# Patient Record
Sex: FEMALE | Race: WHITE | NOT HISPANIC OR LATINO | Employment: UNEMPLOYED | URBAN - METROPOLITAN AREA
[De-identification: names, ages, dates, MRNs, and addresses within clinical notes are randomized per-mention and may not be internally consistent; named-entity substitution may affect disease eponyms.]

---

## 2022-08-11 DIAGNOSIS — G62.9 NEUROPATHY: Primary | ICD-10-CM

## 2022-09-06 ENCOUNTER — OFFICE VISIT (OUTPATIENT)
Dept: ORTHOPEDICS | Facility: CLINIC | Age: 42
End: 2022-09-06
Payer: MEDICAID

## 2022-09-06 VITALS
TEMPERATURE: 98 F | OXYGEN SATURATION: 98 % | WEIGHT: 164 LBS | HEART RATE: 83 BPM | SYSTOLIC BLOOD PRESSURE: 114 MMHG | DIASTOLIC BLOOD PRESSURE: 78 MMHG | HEIGHT: 66 IN | RESPIRATION RATE: 16 BRPM | BODY MASS INDEX: 26.36 KG/M2

## 2022-09-06 DIAGNOSIS — R20.0 BILATERAL HAND NUMBNESS: Primary | ICD-10-CM

## 2022-09-06 PROCEDURE — 3074F PR MOST RECENT SYSTOLIC BLOOD PRESSURE < 130 MM HG: ICD-10-PCS | Mod: CPTII,,, | Performed by: NURSE PRACTITIONER

## 2022-09-06 PROCEDURE — 1160F RVW MEDS BY RX/DR IN RCRD: CPT | Mod: CPTII,,, | Performed by: NURSE PRACTITIONER

## 2022-09-06 PROCEDURE — 1159F PR MEDICATION LIST DOCUMENTED IN MEDICAL RECORD: ICD-10-PCS | Mod: CPTII,,, | Performed by: NURSE PRACTITIONER

## 2022-09-06 PROCEDURE — 99204 PR OFFICE/OUTPT VISIT, NEW, LEVL IV, 45-59 MIN: ICD-10-PCS | Mod: S$PBB,,, | Performed by: NURSE PRACTITIONER

## 2022-09-06 PROCEDURE — 99214 OFFICE O/P EST MOD 30 MIN: CPT | Mod: PBBFAC | Performed by: NURSE PRACTITIONER

## 2022-09-06 PROCEDURE — 3074F SYST BP LT 130 MM HG: CPT | Mod: CPTII,,, | Performed by: NURSE PRACTITIONER

## 2022-09-06 PROCEDURE — 99204 OFFICE O/P NEW MOD 45 MIN: CPT | Mod: S$PBB,,, | Performed by: NURSE PRACTITIONER

## 2022-09-06 PROCEDURE — 3008F BODY MASS INDEX DOCD: CPT | Mod: CPTII,,, | Performed by: NURSE PRACTITIONER

## 2022-09-06 PROCEDURE — 3078F PR MOST RECENT DIASTOLIC BLOOD PRESSURE < 80 MM HG: ICD-10-PCS | Mod: CPTII,,, | Performed by: NURSE PRACTITIONER

## 2022-09-06 PROCEDURE — 1160F PR REVIEW ALL MEDS BY PRESCRIBER/CLIN PHARMACIST DOCUMENTED: ICD-10-PCS | Mod: CPTII,,, | Performed by: NURSE PRACTITIONER

## 2022-09-06 PROCEDURE — 3078F DIAST BP <80 MM HG: CPT | Mod: CPTII,,, | Performed by: NURSE PRACTITIONER

## 2022-09-06 PROCEDURE — 3008F PR BODY MASS INDEX (BMI) DOCUMENTED: ICD-10-PCS | Mod: CPTII,,, | Performed by: NURSE PRACTITIONER

## 2022-09-06 PROCEDURE — 1159F MED LIST DOCD IN RCRD: CPT | Mod: CPTII,,, | Performed by: NURSE PRACTITIONER

## 2022-09-06 NOTE — PROGRESS NOTES
"  Subjective:       New pt   Patient ID: April Molina is a 42 y.o. female. Non-smoker. Employment HX: teacher, currently self employed.    Seen [facility] ortho for same DX since n/a.   Chief Complaint: Numbness of the Left Upper Arm    HPI:    Bilateral L > R numb  elbow and hand  hand and wrist pain. Right dominate  Injury: no known injury  Onset: several years ago worsening over time  Modifying Factors: worse with activity, improved with rest, and increased pain at PM  Associated Symptoms: numbness, decreased ROM, and difficulty sleeping s/t pain  Activity: sedentary with light activity and normal activity without exercise or sports activity.   Previous Treatments: nocturnal wrist splint, HEP , and RX OT completed 1 wks/ 1 xs per week d/c'd by OT after one visit  without significant relief.  PMH: negative for contraindications for NSAID use  Family History: + OA    Note: Referred for left arm / hand numbness, no EMG study     No current outpatient medications on file.  Review of patient's allergies indicates:  No Known Allergies  No results found for: HGBA1C   Body mass index is 26.47 kg/m².   Vitals:    09/06/22 1039   BP: 114/78   Pulse: 83   Resp: 16   Temp: 98 °F (36.7 °C)   SpO2: 98%   Weight: 74.4 kg (164 lb)   Height: 5' 6" (1.676 m)      Review of Systems:  A ten-point review of systems was performed and is negative, except as mentioned above.   Objective:   MSK Bilateral Hand/ Wrist  General:  no apparent distress, no pain indicators, obesity  Inspection: no masses/cyst/nodules, no swelling, no erythema, no contusion, no deconditioning, no deformity, no contracture, no scars  Palpation:  non-tender, normal temperature, palm without nodules or thickening, radial pulse equal 2+  ROM all of the following bilateral without limitation, non-painful  Passive Flexion / Extension    Radial Deviation   Ulnar Deviation  Strength unless otherwise stated, all of the following bilateral 5 / 5, non-painful   Resisted Flexion "       Resisted Extension     Resisted Radial Deviation   Resisted Ulnar Deviation      4 / 5 NP (L)   4 / 5 NP (R)      Special Testing:  Trigger Finger   -- (R)  -- (L)  Phalen    + (R)  + (L)  Jarret Carpal Compression + (R)  + (L)  Tinel Test    -- (R)  -- (L)  Finkelstein Test   -- (R)  -- (L)    MSK Bilateral Elbow                                                                                                   Inspection: no masses/cyst/nodules, no swelling, no erythema, no contusion, no deconditioning, no deformity, no contracture, no scars  Palpation:  non-tender, normal temperature, tendons in palm without nodules or thickening, radial pulse equal 2+  ROM:    Passive Flexion / Extension   movement full and smooth non-painful (R)  movement full and smooth non-painful (L)  Supination    movement full and smooth non-painful (R)  movement full and smooth non-painful (L)  Pronation    movement full and smooth non-painful (R)  movement full and smooth non-painful (L)  Strength:   Resisted Flexion     5 / 5 (R)   5 / 5 (L)  Resisted Extension   5 / 5 (R)   5 / 5 (L)   Strength   5 / 5 (R) 5 / 5 (L)  Special Testing:  Bicep Tendon  Hook Test   -- (R)  -- (L)  Juan Sign  -- (R)  -- (L)  Tennis Elbow  LE Test   -- (R)  -- (L)  Golfer's Elbow  ME Test   -- (R)  -- (L)  Radial Tunnel Syndrome  Tinels Sign  + (R)  -- (L)  Hand Exam normal  Shoulder Exam normal  Neurovascular: Intact to light touch  Neuro/ Psych: Awake, alert, oriented, normal mood and affect  Lymphatic: No LAD  Skin/ Soft Tissue: no rash, skin intact  Assessment:       Imaging: n/a    EMR Review: completed with noted Referral documentation reviewed    1. Bilateral hand numbness    2. BMI 26.0-26.9,adult      Plan:       Ongoing education about DX and treatment recommendations including conservative treatments of daily HEP for carpal tunnel, nocturnal splinting of wrist PRN and OTC NSAID as needed according to label instructions if able to  tolerate.   Treatment plan:  bilateral hand, arm numbness L>R  EMG study needed for definitive DX. Patient referred to Dr. Stephen Kenny [phone] Address: [address] [place] [id number]. Patient instructed to call clinic for f/u once study completed. Appropriate treatment plan will be based on EMG findings.   Procedure: n/a  RX Medications: continue medications as RX per PCP   RTC: after EMG study complete     Roxie LOZANO    Timed Billing Note  Total Time Spent with Patient: 45 minutes  Visit Start Time: 1115  15 minutes spent prior to visit reviewing EMR, prior labs and x-rays.  20 minutes spent in visit with patient completing exam, obtaining history, educating on DX and treatment plan.  10 minutes spent after visit completing EMR documentation.   Visit End Time: 1200

## 2022-10-31 ENCOUNTER — OFFICE VISIT (OUTPATIENT)
Dept: ORTHOPEDICS | Facility: CLINIC | Age: 42
End: 2022-10-31
Payer: MEDICAID

## 2022-10-31 DIAGNOSIS — G56.23 CUBITAL TUNNEL SYNDROME, BILATERAL: ICD-10-CM

## 2022-10-31 DIAGNOSIS — G56.03 BILATERAL CARPAL TUNNEL SYNDROME: Primary | ICD-10-CM

## 2022-10-31 PROCEDURE — 1159F PR MEDICATION LIST DOCUMENTED IN MEDICAL RECORD: ICD-10-PCS | Mod: CPTII,95,, | Performed by: NURSE PRACTITIONER

## 2022-10-31 PROCEDURE — 1159F MED LIST DOCD IN RCRD: CPT | Mod: CPTII,95,, | Performed by: NURSE PRACTITIONER

## 2022-10-31 PROCEDURE — 1160F RVW MEDS BY RX/DR IN RCRD: CPT | Mod: CPTII,95,, | Performed by: NURSE PRACTITIONER

## 2022-10-31 PROCEDURE — 99213 PR OFFICE/OUTPT VISIT, EST, LEVL III, 20-29 MIN: ICD-10-PCS | Mod: 95,,, | Performed by: NURSE PRACTITIONER

## 2022-10-31 PROCEDURE — 99213 OFFICE O/P EST LOW 20 MIN: CPT | Mod: 95,,, | Performed by: NURSE PRACTITIONER

## 2022-10-31 PROCEDURE — 1160F PR REVIEW ALL MEDS BY PRESCRIBER/CLIN PHARMACIST DOCUMENTED: ICD-10-PCS | Mod: CPTII,95,, | Performed by: NURSE PRACTITIONER

## 2022-10-31 NOTE — PROGRESS NOTES
Telemedicine Consent  The patient location is: Home  The chief complaint leading to consultation is: bilateral hand numbness  Visit type: Audio only.  Attempt made for video TM visit but was unsuccessful due to technical issues.  30 minutes of total time spent on the encounter, which includes face-to-face time and non-face-to-face time preparing to see the patient (eg. review of tests), obtaining and/or reviewing separately obtained history, documenting clinical information in the electronic or other health record, independently interpreting results (not separately reported) and communicating results to the patient/family/caregiver or care coordination (not separately reported).   I have reviewed patient's name,  and picture ID if applicable.  I discussed with patient regarding medical services by telemedicine (1) informed of the relationship between the physician and patient and the respective role of any other health care provider with respect to management of the patient (2) session are not recorded and personal health information is protected; and (3) notified that he or she may decline to receive medical services by telemedicine and may withdraw from such care at any time.  Patient consented to consult by telemedicine.     Subjective:       Follow up   Patient ID: April Molina is a 42 y.o. female. Non-smoker. Employment HX: teacher, currently self employed.    Seen [facility] ortho for same DX since n/a.   Chief Complaint: Pain of the Left Hand    HPI:    Bilateral L > R numb  elbow and hand  hand and wrist pain. Right dominate  Injury: no known injury  Onset: several years ago worsening over time  Modifying Factors: worse with activity, improved with rest, and increased pain at PM  Associated Symptoms: numbness, decreased ROM, and difficulty sleeping s/t pain  Activity: sedentary with light activity and normal activity without exercise or sports activity.   Previous Treatments: nocturnal wrist splint, HEP , and  RX OT completed 1 wks/ 1 xs per week d/c'd by OT after one visit  without significant relief.  PMH: negative for contraindications for NSAID use  Family History: + OA    Note: EMG study completed 9/22/22. No changes in symptoms with HEP and splinting.     No current outpatient medications on file.  Review of patient's allergies indicates:  No Known Allergies  No results found for: HGBA1C   There is no height or weight on file to calculate BMI.   There were no vitals filed for this visit.     Review of Systems:  A ten-point review of systems was performed and is negative, except as mentioned above.   Objective:   N/a telemedicine visit   Assessment:       Imaging: n/a    EMG study dated 9/22/22 w/ Dr. Mitchell:  There is abnormal study.  There is electrodiagnostic evidence of bilateral median neuropathy at the wrist, carpal tunnel syndrome, moderate on the left and mild on the right.  Bilateral ulnar neuropathy at the elbow mild on the left and moderate on the right.    EMR Review: completed with noted Referral documentation reviewed and prior visit 9/6/22 reviewed.    1. Bilateral carpal tunnel syndrome    2. Cubital tunnel syndrome, bilateral    3. BMI 26.0-26.9,adult      Plan:       Ongoing education about DX and treatment recommendations including conservative treatments of daily HEP for carpal tunnel, nocturnal splinting of wrist PRN and OTC NSAID as needed according to label instructions if able to tolerate.   Treatment plan:  bilateral hand, arm numbness L>R  EMG study received and recommend CSI today for symptom relief.  If inadequate relief at f/u will discuss referral to ortho res.    Procedure: n/a  RX Medications: continue medications as RX per PCP   RTC: next available procedure only visit  and 3 months f/u from procedure date telemedicine.      Roxie LOZANO    Timed Billing Note  Total Time Spent with Patient: 30 minutes  Visit Start Time: 0830  5 minutes spent prior to visit reviewing EMR,  prior labs and x-rays.  15 minutes spent in audio only visit with patient for medical discussion, obtaining history, educating on DX and treatment plan.  10 minutes spent after visit completing EMR documentation.   Visit End Time: 0900

## 2022-11-11 ENCOUNTER — PROCEDURE VISIT (OUTPATIENT)
Dept: ORTHOPEDICS | Facility: CLINIC | Age: 42
End: 2022-11-11
Payer: MEDICAID

## 2022-11-11 DIAGNOSIS — G56.03 BILATERAL CARPAL TUNNEL SYNDROME: Primary | ICD-10-CM

## 2022-11-11 PROCEDURE — 99499 NO LOS: ICD-10-PCS | Mod: S$PBB,,, | Performed by: NURSE PRACTITIONER

## 2022-11-11 PROCEDURE — 99499 UNLISTED E&M SERVICE: CPT | Mod: S$PBB,,, | Performed by: NURSE PRACTITIONER

## 2022-11-11 PROCEDURE — 20526: ICD-10-PCS | Mod: 50,S$PBB,, | Performed by: NURSE PRACTITIONER

## 2022-11-11 PROCEDURE — 20526 THER INJECTION CARP TUNNEL: CPT | Mod: 50,PBBFAC | Performed by: NURSE PRACTITIONER

## 2022-11-11 RX ORDER — TRIAMCINOLONE ACETONIDE 40 MG/ML
40 INJECTION, SUSPENSION INTRA-ARTICULAR; INTRAMUSCULAR
Status: DISCONTINUED | OUTPATIENT
Start: 2022-11-11 | End: 2022-11-11 | Stop reason: HOSPADM

## 2022-11-11 RX ORDER — TRIAMCINOLONE ACETONIDE 40 MG/ML
40 INJECTION, SUSPENSION INTRA-ARTICULAR; INTRAMUSCULAR
Status: ACTIVE | OUTPATIENT
Start: 2022-11-11

## 2022-11-11 RX ADMIN — TRIAMCINOLONE ACETONIDE 40 MG: 40 INJECTION, SUSPENSION INTRA-ARTICULAR; INTRAMUSCULAR at 08:11

## 2022-11-11 NOTE — PROCEDURES
Carpal Tunnel Bilateral    Date/Time: 11/11/2022 8:10 AM  Performed by: Roxie Rosa NP  Authorized by: Roxie Rosa NP     Consent Done?:  Yes (Verbal)  Indications:  Pain  Site marked: the procedure site was marked    Timeout: prior to procedure the correct patient, procedure, and site was verified    Local anesthesia used?: Yes    Local anesthetic:  Topical anesthetic  Location:  Wrist  Site:  R carpal tunnel and L carpal tunnel  Ultrasonic Guidance for Needle Placement?: No    Needle size:  25 G  Approach:  Dorsal  Medications:  40 mg triamcinolone acetonide 40 mg/mL; 40 mg triamcinolone acetonide 40 mg/mL  Patient tolerance:  Patient tolerated the procedure well with no immediate complications     Additional Comments: Topical ethyl chloride was applied. Contents of syringe included:  40mg of Kenalog   Complications: None   EBL: None   Post Procedure: Patient alert, and moving all extremities. ROM improved, pain decreased.  Good peripheral pulses, no signs of vascular compromise and range of motion intact.  Aftercare instructions were given to patient at time of discharge.  Relative rest for 3 days-avoiding excess activity.  Place ice on the area for 15 minutes every 4-6 hours. Patient may take Tylenol a 1000 mg b.i.d. or ibuprofen 600 mg t.i.d. for the next 3-4 days if not on medication already and safe to take pending co-morbidities.  Protect the area for the next 1-8 hours if anesthetic was used.  Avoid excessive activity for the next 3-4 weeks.  ER precautions given for fever, severe joint pain or allergic reaction or other new symptoms related to the joint injection.

## 2023-02-06 ENCOUNTER — OFFICE VISIT (OUTPATIENT)
Dept: ORTHOPEDICS | Facility: CLINIC | Age: 43
End: 2023-02-06
Payer: MEDICAID

## 2023-02-06 VITALS — WEIGHT: 164 LBS | HEIGHT: 66 IN | BODY MASS INDEX: 26.36 KG/M2

## 2023-02-06 DIAGNOSIS — G56.03 BILATERAL CARPAL TUNNEL SYNDROME: Primary | ICD-10-CM

## 2023-02-06 DIAGNOSIS — G56.23 CUBITAL TUNNEL SYNDROME, BILATERAL: ICD-10-CM

## 2023-02-06 PROCEDURE — 1159F PR MEDICATION LIST DOCUMENTED IN MEDICAL RECORD: ICD-10-PCS | Mod: CPTII,95,, | Performed by: NURSE PRACTITIONER

## 2023-02-06 PROCEDURE — 99214 OFFICE O/P EST MOD 30 MIN: CPT | Mod: 95,,, | Performed by: NURSE PRACTITIONER

## 2023-02-06 PROCEDURE — 99214 PR OFFICE/OUTPT VISIT, EST, LEVL IV, 30-39 MIN: ICD-10-PCS | Mod: 95,,, | Performed by: NURSE PRACTITIONER

## 2023-02-06 PROCEDURE — 3008F BODY MASS INDEX DOCD: CPT | Mod: CPTII,95,, | Performed by: NURSE PRACTITIONER

## 2023-02-06 PROCEDURE — 1160F RVW MEDS BY RX/DR IN RCRD: CPT | Mod: CPTII,95,, | Performed by: NURSE PRACTITIONER

## 2023-02-06 PROCEDURE — 1160F PR REVIEW ALL MEDS BY PRESCRIBER/CLIN PHARMACIST DOCUMENTED: ICD-10-PCS | Mod: CPTII,95,, | Performed by: NURSE PRACTITIONER

## 2023-02-06 PROCEDURE — 3008F PR BODY MASS INDEX (BMI) DOCUMENTED: ICD-10-PCS | Mod: CPTII,95,, | Performed by: NURSE PRACTITIONER

## 2023-02-06 PROCEDURE — 1159F MED LIST DOCD IN RCRD: CPT | Mod: CPTII,95,, | Performed by: NURSE PRACTITIONER

## 2023-02-06 NOTE — PROGRESS NOTES
Telemedicine Consent  The patient location is: Home  The chief complaint leading to consultation is: bilateral hand numbness  Visit type: Audio only.  Attempt made for video TM visit but was unsuccessful due to technical issues.  30 minutes of total time spent on the encounter, which includes face-to-face time and non-face-to-face time preparing to see the patient (eg. review of tests), obtaining and/or reviewing separately obtained history, documenting clinical information in the electronic or other health record, independently interpreting results (not separately reported) and communicating results to the patient/family/caregiver or care coordination (not separately reported).   I have reviewed patient's name,  and picture ID if applicable.  I discussed with patient regarding medical services by telemedicine (1) informed of the relationship between the physician and patient and the respective role of any other health care provider with respect to management of the patient (2) session are not recorded and personal health information is protected; and (3) notified that he or she may decline to receive medical services by telemedicine and may withdraw from such care at any time.  Patient consented to consult by telemedicine.     Subjective:       Follow up   Patient ID: April Moilna is a 42 y.o. female. Non-smoker. Employment HX: teacher, currently self employed.    Seen [facility] ortho for same DX since n/a.   Chief Complaint: Pain of the Right Hand, Pain of the Left Hand, and Hand Pain (Carpal Tunnel)    HPI:    Bilateral L > R numb  elbow and hand  hand and wrist pain. Right dominate  Injury: no known injury  Onset: several years ago worsening over time  Modifying Factors: worse with activity, improved with rest, and increased pain at PM  Associated Symptoms: numbness, decreased ROM, and difficulty sleeping s/t pain  Activity: sedentary with light activity and normal activity without exercise or sports activity.  "  Previous Treatments: nocturnal wrist splint, HEP , CSI since 2022 last injection 11/11/22, and RX OT completed 1 wks/ 1 xs per week d/c'd by OT after one visit  with significant relief.  PMH: negative for contraindications for NSAID use  Family History: + OA    Note: Patient with complete relief to date.  Does not desire further treatments at this time.     No current outpatient medications on file.    Current Facility-Administered Medications:     triamcinolone acetonide injection 40 mg, 40 mg, Intra-articular, 1 time in Clinic/HOD, Roxie Rosa NP    triamcinolone acetonide injection 40 mg, 40 mg, Intra-articular, 1 time in Clinic/HOD, Roxie Rosa NP  Review of patient's allergies indicates:  No Known Allergies  No results found for: HGBA1C   Body mass index is 26.47 kg/m².   Vitals:    02/06/23 0849   Weight: 74.4 kg (164 lb)   Height: 5' 6" (1.676 m)     Review of Systems:  A ten-point review of systems was performed and is negative, except as mentioned above.   Objective:   N/a telemedicine visit   Assessment:       Imaging: n/a    EMG study dated 9/22/22 w/ Dr. Mitchell:  There is abnormal study.  There is electrodiagnostic evidence of bilateral median neuropathy at the wrist, carpal tunnel syndrome, moderate on the left and mild on the right.  Bilateral ulnar neuropathy at the elbow mild on the left and moderate on the right.    EMR Review: completed with noted prior visit 10/31/22 reviewed.    1. Bilateral carpal tunnel syndrome    2. Cubital tunnel syndrome, bilateral    3. BMI 26.0-26.9,adult      Plan:       Ongoing education about DX and treatment recommendations including conservative treatments of daily HEP for carpal tunnel, nocturnal splinting of wrist PRN and OTC NSAID as needed according to label instructions if able to tolerate.   Treatment plan: Carpal tunnel syndrome bilateral moderate and Ulnar tunnel syndrome at the elbow mild left and moderate right   Patient with complete " symptom relief at this time.  Encouraged to continue with HEP regularly and f/u with PCP  Procedure: n/a  RX Medications: continue medications as RX per PCP   RTC: PRN if symptoms worsen or return     Roxie LOZANO    Timed Billing Note  Total Time Spent with Patient: 30 minutes  Visit Start Time: 0830  5 minutes spent prior to visit reviewing EMR, prior labs and x-rays.  15 minutes spent in audio only visit with patient for medical discussion, obtaining history, educating on DX and treatment plan.  10 minutes spent after visit completing EMR documentation.   Visit End Time: 0900

## 2023-07-05 DIAGNOSIS — N81.9 FEMALE GENITAL PROLAPSE, UNSPECIFIED TYPE: Primary | ICD-10-CM

## 2023-08-22 PROBLEM — N81.9 PROLAPSE OF FEMALE PELVIC ORGANS: Status: ACTIVE | Noted: 2023-08-22

## 2023-08-22 PROBLEM — N39.3 SUI (STRESS URINARY INCONTINENCE, FEMALE): Status: ACTIVE | Noted: 2023-08-22

## 2023-08-30 ENCOUNTER — OFFICE VISIT (OUTPATIENT)
Dept: ORTHOPEDICS | Facility: CLINIC | Age: 43
End: 2023-08-30
Payer: MEDICAID

## 2023-08-30 ENCOUNTER — HOSPITAL ENCOUNTER (OUTPATIENT)
Dept: RADIOLOGY | Facility: HOSPITAL | Age: 43
Discharge: HOME | End: 2023-08-30
Attending: NURSE PRACTITIONER
Payer: MEDICAID

## 2023-08-30 VITALS — BODY MASS INDEX: 23.43 KG/M2 | WEIGHT: 145.81 LBS | HEIGHT: 66 IN

## 2023-08-30 DIAGNOSIS — R52 PAIN: ICD-10-CM

## 2023-08-30 DIAGNOSIS — G56.03 BILATERAL CARPAL TUNNEL SYNDROME: Primary | ICD-10-CM

## 2023-08-30 DIAGNOSIS — G56.23 CUBITAL TUNNEL SYNDROME, BILATERAL: ICD-10-CM

## 2023-08-30 PROCEDURE — 1160F PR REVIEW ALL MEDS BY PRESCRIBER/CLIN PHARMACIST DOCUMENTED: ICD-10-PCS | Mod: CPTII,,, | Performed by: NURSE PRACTITIONER

## 2023-08-30 PROCEDURE — 99213 OFFICE O/P EST LOW 20 MIN: CPT | Mod: PBBFAC | Performed by: NURSE PRACTITIONER

## 2023-08-30 PROCEDURE — 1160F RVW MEDS BY RX/DR IN RCRD: CPT | Mod: CPTII,,, | Performed by: NURSE PRACTITIONER

## 2023-08-30 PROCEDURE — 73130 X-RAY EXAM OF HAND: CPT | Mod: TC,LT

## 2023-08-30 PROCEDURE — 99215 OFFICE O/P EST HI 40 MIN: CPT | Mod: S$PBB,,, | Performed by: NURSE PRACTITIONER

## 2023-08-30 PROCEDURE — 99215 PR OFFICE/OUTPT VISIT, EST, LEVL V, 40-54 MIN: ICD-10-PCS | Mod: S$PBB,,, | Performed by: NURSE PRACTITIONER

## 2023-08-30 PROCEDURE — 3008F PR BODY MASS INDEX (BMI) DOCUMENTED: ICD-10-PCS | Mod: CPTII,,, | Performed by: NURSE PRACTITIONER

## 2023-08-30 PROCEDURE — 1159F MED LIST DOCD IN RCRD: CPT | Mod: CPTII,,, | Performed by: NURSE PRACTITIONER

## 2023-08-30 PROCEDURE — 3008F BODY MASS INDEX DOCD: CPT | Mod: CPTII,,, | Performed by: NURSE PRACTITIONER

## 2023-08-30 PROCEDURE — 1159F PR MEDICATION LIST DOCUMENTED IN MEDICAL RECORD: ICD-10-PCS | Mod: CPTII,,, | Performed by: NURSE PRACTITIONER

## 2023-08-30 PROCEDURE — 73130 X-RAY EXAM OF HAND: CPT | Mod: TC,RT

## 2023-08-30 NOTE — PROGRESS NOTES
Subjective:   PATIENT ID: April Molina is a 43 y.o. female. Non-smoker. Employment HX: teacher, currently self employed.    Seen [facility] ortho for same DX since 2022.   CHIEF COMPLAINT: Pain of the Right Hand and Pain of the Left Hand    HPI:    Bilateral L > R numb diffuse  hand, elbow  pain. Right dominate  Injury: no known injury  Onset: several years ago worsening over time  Modifying Factors: worse with activity, improved with rest, stiffness after mobilization, and stiffness improves with less than 30 minutes activity  Associated Symptoms: numbness, decreased ROM, and difficulty sleeping s/t pain  Activity: sedentary with light activity and pain moderately interferes with ADLs .   Previous Treatments: HEP , RX NSAIDs, CSI since 2022 last injection 11/11/22, and RX OT completed 1 wks/ 1 xs per week d/c'd 2022  without significant relief.  PMH: negative for contraindications for NSAID use  Family History: + OA    NOTE: Follow up for bilateral hand carpal tunnel and cubital tunnel.  CSI given 11/11/22 with some relief, lasting 6-9 months.  Symptoms returning and continues with numbness during night awakening patient and affecting ADLs.  Interested in surgical treatment options due to failed conservative treatments.     No current outpatient medications on file.    Current Facility-Administered Medications:     triamcinolone acetonide injection 40 mg, 40 mg, Intra-articular, 1 time in Clinic/HOD, Roxie Rosa NP    triamcinolone acetonide injection 40 mg, 40 mg, Intra-articular, 1 time in Clinic/HOD, Roxie Rosa NP  Review of patient's allergies indicates:  No Known Allergies  Hemoglobin A1C   Date Value Ref Range Status   05/30/2023 5.4 4.8 - 5.6 % Final     Comment:              Prediabetes: 5.7 - 6.4           Diabetes: >6.4           Glycemic control for adults with diabetes: <7.0      Body mass index is 23.53 kg/m².   Vitals:    08/30/23 1412   Weight: 66.1 kg (145 lb 12.8 oz)   Height: 5'  "6" (1.676 m)      REVIEW OF SYSTEMS:  A ten-point review of systems was performed and is negative, except as mentioned above   Objective:   MSK Hand/ Wrist Exam  General:  no apparent distress, no pain indicators,  well nourished  Inspection:  LEFT HAND RIGHT HAND   no joint swelling, no soft tissue swelling, no erythema, no lesions, no contusion, no gross deformities, no nodules, no atrophy of thenar or hypothenar eminence,  no scars no joint swelling, no soft tissue swelling, no erythema, no lesions, no contusion, no gross deformities, no nodules, no atrophy of thenar or hypothenar eminence,  no scars   Palpation:     LEFT HAND RIGHT HAND   no joint tenderness, normal temperature, no palpable palm nodules, no finger joint tenderness or crepitus, no active triggering or locking of digits and no tenderness of digital flexor tendons, no tenderness of thenar and hypothenar eminence no joint tenderness, normal temperature, no palpable palm nodules, no finger joint tenderness or crepitus, no active triggering or locking of digits and no tenderness of digital flexor tendons, no tenderness of thenar and hypothenar eminence     ROM LEFT HAND RIGHT HAND   Wrist Flexion / Extension   80 / 75 80 / 75   Wrist Radial / Ulnar Deviation   15 / 30 15 / 30   Thumb CMC/ MCP/ IP WNL w/o pain WNL w/o pain   Finger MCP/PIP/DIP WNL w/o pain WNL w/o pain      Strength LEFT HAND RIGHT HAND    4 / 5 w/o pain 4 / 5 w/o pain       Special Testing:         Carpal Tunnel Syndrome L+ L-- R+ R-- Not Tested    Phalen [x] [] [x] [] []    Jarret Carpal Compression [x] [] [x] [] []    Cubital Tunnel Syndrome         Tinel's Test @ elbow [x] [] [x] [] []    De Quervain's Tenosynovitis         Finkelstein Test [] [x] [] [x] []    Ligament Injury         Scaphoid Shift Test [] [] [] [] [x]    Lunotriquetral Shuck Test [] [] [] [] [x]    UCL Ligament Thumb Test [] [] [] [] [x]    Ulnar Fovea Sign [] [] [] [] [x]    Nerve Injury  Radial Nerve  IP " joint extension against resistance intact   Median Nerve Palmar abduction of thumb intact   Anterior Interosseous Branch OK sign intact   Ulnar Nerve   Abduction of fingers against resistance intact, Froment's sign negative   Neuro/ Vascular: Spurling's Test negative, Intact to light touch, capillary refill 2 seconds,  radial pulse equal 2+, 2 point discrimination intact, Tahir's test intact  Psych: Awake, alert, oriented, normal mood and affect  Lymphatic: No LAD  Skin/ Soft Tissue: no rash, skin intact  Assessment:   IMAGING: X-ray 3 views of right and left hand ordered, reviewed and independently interpreted by me. Discussed with patient. No acute findings .  Awaiting radiologist findings.     EMR REVIEW: completed with noted prior visit 2/6/23 reviewed.    EMG Study: EMG study dated 9/22/22 w/ Dr. Mitchell:  There is abnormal study.  There is electrodiagnostic evidence of bilateral median neuropathy at the wrist, carpal tunnel syndrome, moderate on the left and mild on the right.  Bilateral ulnar neuropathy at the elbow mild on the left and moderate on the right.    DIAGNOSIS:  1. Bilateral carpal tunnel syndrome    2. Cubital tunnel syndrome, bilateral    3. BMI 23.0-23.9, adult       Plan:     Orders Placed This Encounter    X-Ray Hand Complete Right    X-Ray Hand Complete Left     Ongoing education about DX and treatment recommendations including conservative treatments of daily HEP for carpal tunnel, nocturnal splinting of wrist PRN and OTC NSAID as needed according to label instructions if able to tolerate.   Treatment plan: Moderate exacerbation of chronic Bilateral L < R Carpal tunnel syndrome moderate and Ulnar tunnel syndrome at the elbow mild left, moderate right  Patient will be referred to ortho res. for surgical eval. and treatment of carpal tunnel s/t failed conservative treatments.  Procedure: n/a  RX Medications: continue medications as RX per PCP .     RTC: next available appt. with ortho res.    [place] in Oct. 2023       Leah Menard-Neumann FNP [facility]  Procedure Note:   None    Time Based Billing     Total Time Spent with Patient: 40 minutes .  Visit Start Time: 1410  10 minutes spent prior to visit reviewing EMR, prior labs and x-rays.  20 minutes spent in visit with patient face-to-face time completing exam, obtaining history, educating on DX and treatment plan.  10 minutes spent after visit completing EMR documentation.   Visit End Time: 1450